# Patient Record
Sex: FEMALE | Race: BLACK OR AFRICAN AMERICAN | NOT HISPANIC OR LATINO | Employment: UNEMPLOYED | URBAN - METROPOLITAN AREA
[De-identification: names, ages, dates, MRNs, and addresses within clinical notes are randomized per-mention and may not be internally consistent; named-entity substitution may affect disease eponyms.]

---

## 2023-11-15 ENCOUNTER — APPOINTMENT (OUTPATIENT)
Dept: ULTRASOUND IMAGING | Facility: CLINIC | Age: 43
End: 2023-11-15
Attending: EMERGENCY MEDICINE
Payer: COMMERCIAL

## 2023-11-15 ENCOUNTER — HOSPITAL ENCOUNTER (EMERGENCY)
Facility: CLINIC | Age: 43
Discharge: HOME | End: 2023-11-15
Attending: EMERGENCY MEDICINE | Admitting: EMERGENCY MEDICINE
Payer: COMMERCIAL

## 2023-11-15 VITALS
OXYGEN SATURATION: 100 % | SYSTOLIC BLOOD PRESSURE: 128 MMHG | DIASTOLIC BLOOD PRESSURE: 91 MMHG | WEIGHT: 207.67 LBS | HEART RATE: 101 BPM | TEMPERATURE: 98.3 F | RESPIRATION RATE: 20 BRPM

## 2023-11-15 DIAGNOSIS — R20.2 NUMBNESS AND TINGLING OF RIGHT LEG: ICD-10-CM

## 2023-11-15 DIAGNOSIS — R20.0 NUMBNESS AND TINGLING OF RIGHT LEG: ICD-10-CM

## 2023-11-15 LAB
ANION GAP SERPL CALCULATED.3IONS-SCNC: 11 MMOL/L (ref 7–15)
BASOPHILS # BLD AUTO: 0.1 10E3/UL (ref 0–0.2)
BASOPHILS NFR BLD AUTO: 1 %
BUN SERPL-MCNC: 12.6 MG/DL (ref 6–20)
CALCIUM SERPL-MCNC: 9.4 MG/DL (ref 8.6–10)
CHLORIDE SERPL-SCNC: 100 MMOL/L (ref 98–107)
CREAT SERPL-MCNC: 0.86 MG/DL (ref 0.51–0.95)
DEPRECATED HCO3 PLAS-SCNC: 27 MMOL/L (ref 22–29)
EGFRCR SERPLBLD CKD-EPI 2021: 85 ML/MIN/1.73M2
EOSINOPHIL # BLD AUTO: 0.2 10E3/UL (ref 0–0.7)
EOSINOPHIL NFR BLD AUTO: 3 %
ERYTHROCYTE [DISTWIDTH] IN BLOOD BY AUTOMATED COUNT: 12.3 % (ref 10–15)
GLUCOSE SERPL-MCNC: 129 MG/DL (ref 70–99)
HCT VFR BLD AUTO: 41.1 % (ref 35–47)
HGB BLD-MCNC: 13.4 G/DL (ref 11.7–15.7)
IMM GRANULOCYTES # BLD: 0 10E3/UL
IMM GRANULOCYTES NFR BLD: 0 %
LYMPHOCYTES # BLD AUTO: 2.6 10E3/UL (ref 0.8–5.3)
LYMPHOCYTES NFR BLD AUTO: 39 %
MAGNESIUM SERPL-MCNC: 2 MG/DL (ref 1.7–2.3)
MCH RBC QN AUTO: 28.6 PG (ref 26.5–33)
MCHC RBC AUTO-ENTMCNC: 32.6 G/DL (ref 31.5–36.5)
MCV RBC AUTO: 88 FL (ref 78–100)
MONOCYTES # BLD AUTO: 0.5 10E3/UL (ref 0–1.3)
MONOCYTES NFR BLD AUTO: 8 %
NEUTROPHILS # BLD AUTO: 3.3 10E3/UL (ref 1.6–8.3)
NEUTROPHILS NFR BLD AUTO: 49 %
NRBC # BLD AUTO: 0 10E3/UL
NRBC BLD AUTO-RTO: 0 /100
PLATELET # BLD AUTO: 322 10E3/UL (ref 150–450)
POTASSIUM SERPL-SCNC: 3.4 MMOL/L (ref 3.4–5.3)
RBC # BLD AUTO: 4.69 10E6/UL (ref 3.8–5.2)
SODIUM SERPL-SCNC: 138 MMOL/L (ref 135–145)
WBC # BLD AUTO: 6.6 10E3/UL (ref 4–11)

## 2023-11-15 PROCEDURE — 85025 COMPLETE CBC W/AUTO DIFF WBC: CPT | Performed by: EMERGENCY MEDICINE

## 2023-11-15 PROCEDURE — 36415 COLL VENOUS BLD VENIPUNCTURE: CPT | Performed by: EMERGENCY MEDICINE

## 2023-11-15 PROCEDURE — 80048 BASIC METABOLIC PNL TOTAL CA: CPT | Performed by: EMERGENCY MEDICINE

## 2023-11-15 PROCEDURE — 83735 ASSAY OF MAGNESIUM: CPT | Performed by: EMERGENCY MEDICINE

## 2023-11-15 PROCEDURE — 93971 EXTREMITY STUDY: CPT | Mod: RT

## 2023-11-15 PROCEDURE — 99284 EMERGENCY DEPT VISIT MOD MDM: CPT | Mod: 25

## 2023-11-15 ASSESSMENT — ACTIVITIES OF DAILY LIVING (ADL)
ADLS_ACUITY_SCORE: 35
ADLS_ACUITY_SCORE: 33

## 2023-11-15 NOTE — ED TRIAGE NOTES
Pt with tingling to R leg from knee to foot. Pain with ambulation. No known injuries to legs or back. New diagnosis to DM as of last week. Denies blood thinners or any recent travel/birthcontrol or smoking. ABCs intact A&Ox4

## 2023-11-15 NOTE — ED PROVIDER NOTES
History     Chief Complaint:  Leg Pain       The history is provided by the patient.      Tamie Byrne is a 43 year old female with history of hypertension and diabetes mellitus who presents to the ED with leg pain. The patient reports that starting yesterday she began experiencing right leg pain below the knee that radiates down to her toes. She confirms that the pain is all around. She states that she is also experiencing hypersensitivity in her right foot. She notes that she moved here from [place] on August 15th. She denies recent trips, falls, stumbles, diabetes mellitus medications use, back pain, redness, fever, chills, or recent travel.    Independent Historian:   None - Patient Only    Review of External Notes:   None       Medications:    Gabapentin  Topiramate  Atorvastatin  Buspirone  Trazodone  Olmesartan-hydrochlorothiazide     Past Medical History:    Seizure   Headache syndrome  HTN (hypertension)  Insomnia, idiopathic  Anxiety  Hyperlipidemia      Past Surgical History:    Hysterectomy    Physical Exam   Patient Vitals for the past 24 hrs:   BP Temp Temp src Pulse Resp SpO2 Weight   11/15/23 2040 (!) 128/91 -- -- 101 20 100 % --   11/15/23 1700 -- -- -- -- -- -- 94.2 kg (207 lb 10.8 oz)   11/15/23 1658 (!) 134/94 98.3  F (36.8  C) Oral 106 18 99 % --        Physical Exam    Constitutional: Alert, attentive, GCS 15   Eyes: EOM are normal, anicteric, conjugate gaze  CV: distal extremities warm, well perfused  Chest: Non-labored breathing on RA  MSK: Right leg without overt swelling. Sensation intact. More hyperesthetic with intact strength. Gait normal.   Neurological: Alert, attentive, moving all extremities equally.   Skin: Skin is warm and dry.     Emergency Department Course   Imaging:  US Lower Extremity Venous Duplex Right   Final Result   IMPRESSION:   1.  No deep venous thrombosis in the right lower extremity.         Laboratory:  Labs Ordered and Resulted from Time of ED Arrival to Time  of ED Departure   BASIC METABOLIC PANEL - Abnormal       Result Value    Sodium 138      Potassium 3.4      Chloride 100      Carbon Dioxide (CO2) 27      Anion Gap 11      Urea Nitrogen 12.6      Creatinine 0.86      GFR Estimate 85      Calcium 9.4      Glucose 129 (*)    MAGNESIUM - Normal    Magnesium 2.0     CBC WITH PLATELETS AND DIFFERENTIAL    WBC Count 6.6      RBC Count 4.69      Hemoglobin 13.4      Hematocrit 41.1      MCV 88      MCH 28.6      MCHC 32.6      RDW 12.3      Platelet Count 322      % Neutrophils 49      % Lymphocytes 39      % Monocytes 8      % Eosinophils 3      % Basophils 1      % Immature Granulocytes 0      NRBCs per 100 WBC 0      Absolute Neutrophils 3.3      Absolute Lymphocytes 2.6      Absolute Monocytes 0.5      Absolute Eosinophils 0.2      Absolute Basophils 0.1      Absolute Immature Granulocytes 0.0      Absolute NRBCs 0.0        Emergency Department Course & Assessments:           Interventions:  Medications - No data to display       Independent Interpretation (X-rays, CTs, rhythm strip):  None    Assessments/Consultations/Discussion of Management or Tests:  ED Course as of 11/15/23 2132   Wed Nov 15, 2023   1730 I briefly obtained history and examined the patient in triage.     I rechecked the patient and explained findings.        Social Determinants of Health affecting care:   None    Disposition:  The patient was discharged to home.     Impression & Plan    Medical Decision Makin-year-old woman past medical history significant for anxiety, hypertension, new diagnosis of diabetes on gabapentin presenting for evaluation of isolated right lower leg hyperesthesia and tingling.  No reported trauma, ultrasound here is negative for DVT which was obtained due to some mild swelling.  She has no radicular symptoms, I do not suspect stroke and her anatomic pattern does not follow with any peripheral nerve.  I recommended neurology follow-up which she has a scheduled.   Return precautions reviewed and she was discharged    Diagnosis:    ICD-10-CM    1. Numbness and tingling of right leg  R20.0     R20.2            Kendell Ann MD  [employer]  1:17 AM 11/16/23         Scribe Disclosure:  I, Ira Aviles, am serving as a scribe at 7:28 PM on 11/15/2023 to document services personally performed by Kendell Ann MD based on my observations and the provider's statements to me.     11/15/2023   Kendell Ann MD Dunbar, John Forrest, MD  11/16/23 0117